# Patient Record
Sex: FEMALE | Race: WHITE | NOT HISPANIC OR LATINO
[De-identification: names, ages, dates, MRNs, and addresses within clinical notes are randomized per-mention and may not be internally consistent; named-entity substitution may affect disease eponyms.]

---

## 2017-03-07 PROBLEM — Z00.00 ENCOUNTER FOR PREVENTIVE HEALTH EXAMINATION: Status: ACTIVE | Noted: 2017-03-07

## 2017-03-08 ENCOUNTER — APPOINTMENT (OUTPATIENT)
Dept: OBGYN | Facility: CLINIC | Age: 62
End: 2017-03-08

## 2017-03-08 VITALS
HEIGHT: 67 IN | SYSTOLIC BLOOD PRESSURE: 120 MMHG | BODY MASS INDEX: 27.94 KG/M2 | DIASTOLIC BLOOD PRESSURE: 70 MMHG | WEIGHT: 178 LBS

## 2017-03-08 DIAGNOSIS — R31.9 HEMATURIA, UNSPECIFIED: ICD-10-CM

## 2017-03-08 DIAGNOSIS — Z85.3 PERSONAL HISTORY OF MALIGNANT NEOPLASM OF BREAST: ICD-10-CM

## 2017-03-08 DIAGNOSIS — Z82.49 FAMILY HISTORY OF ISCHEMIC HEART DISEASE AND OTHER DISEASES OF THE CIRCULATORY SYSTEM: ICD-10-CM

## 2017-03-08 DIAGNOSIS — N81.10 CYSTOCELE, UNSPECIFIED: ICD-10-CM

## 2017-03-08 LAB
BILIRUB UR QL STRIP: NEGATIVE
CLARITY UR: CLEAR
COLLECTION METHOD: NORMAL
GLUCOSE UR-MCNC: NEGATIVE
HCG UR QL: 0.2 EU/DL
HGB UR QL STRIP.AUTO: NORMAL
KETONES UR-MCNC: NEGATIVE
LEUKOCYTE ESTERASE UR QL STRIP: NEGATIVE
NITRITE UR QL STRIP: NEGATIVE
PH UR STRIP: 5.5
PROT UR STRIP-MCNC: NORMAL
SP GR UR STRIP: >=1.03

## 2017-03-09 LAB
APPEARANCE: CLEAR
BACTERIA: NEGATIVE
BILIRUBIN URINE: NEGATIVE
BLOOD URINE: NEGATIVE
COLOR: YELLOW
GLUCOSE QUALITATIVE U: NORMAL MG/DL
HYALINE CASTS: 3 /LPF
KETONES URINE: ABNORMAL
LEUKOCYTE ESTERASE URINE: NEGATIVE
MICROSCOPIC-UA: NORMAL
NITRITE URINE: NEGATIVE
PH URINE: 5.5
PROTEIN URINE: NEGATIVE MG/DL
RED BLOOD CELLS URINE: 2 /HPF
SPECIFIC GRAVITY URINE: 1.02
SQUAMOUS EPITHELIAL CELLS: 3 /HPF
UROBILINOGEN URINE: NORMAL MG/DL
WHITE BLOOD CELLS URINE: 1 /HPF

## 2017-03-10 LAB
BACTERIA UR CULT: NORMAL
HPV HIGH+LOW RISK DNA PNL CVX: NEGATIVE

## 2017-03-13 LAB — CYTOLOGY CVX/VAG DOC THIN PREP: NORMAL

## 2017-04-24 ENCOUNTER — FORM ENCOUNTER (OUTPATIENT)
Age: 62
End: 2017-04-24

## 2017-04-25 ENCOUNTER — APPOINTMENT (OUTPATIENT)
Dept: RADIOLOGY | Facility: CLINIC | Age: 62
End: 2017-04-25

## 2017-04-25 ENCOUNTER — OUTPATIENT (OUTPATIENT)
Dept: OUTPATIENT SERVICES | Facility: HOSPITAL | Age: 62
LOS: 1 days | End: 2017-04-25
Payer: COMMERCIAL

## 2017-04-25 ENCOUNTER — APPOINTMENT (OUTPATIENT)
Dept: ULTRASOUND IMAGING | Facility: CLINIC | Age: 62
End: 2017-04-25

## 2017-04-25 DIAGNOSIS — Z00.8 ENCOUNTER FOR OTHER GENERAL EXAMINATION: ICD-10-CM

## 2017-04-25 PROCEDURE — 77080 DXA BONE DENSITY AXIAL: CPT

## 2017-04-25 PROCEDURE — 76641 ULTRASOUND BREAST COMPLETE: CPT

## 2017-04-28 ENCOUNTER — MESSAGE (OUTPATIENT)
Age: 62
End: 2017-04-28

## 2017-04-28 DIAGNOSIS — R92.8 OTHER ABNORMAL AND INCONCLUSIVE FINDINGS ON DIAGNOSTIC IMAGING OF BREAST: ICD-10-CM

## 2017-04-28 DIAGNOSIS — M85.89 OTHER SPECIFIED DISORDERS OF BONE DENSITY AND STRUCTURE, MULTIPLE SITES: ICD-10-CM

## 2017-04-28 DIAGNOSIS — Z78.0 ASYMPTOMATIC MENOPAUSAL STATE: ICD-10-CM

## 2017-04-30 ENCOUNTER — TRANSCRIPTION ENCOUNTER (OUTPATIENT)
Age: 62
End: 2017-04-30

## 2020-10-23 ENCOUNTER — APPOINTMENT (OUTPATIENT)
Dept: NEUROSURGERY | Facility: CLINIC | Age: 65
End: 2020-10-23
Payer: COMMERCIAL

## 2020-10-23 ENCOUNTER — OUTPATIENT (OUTPATIENT)
Dept: OUTPATIENT SERVICES | Facility: HOSPITAL | Age: 65
LOS: 1 days | End: 2020-10-23
Payer: COMMERCIAL

## 2020-10-23 ENCOUNTER — APPOINTMENT (OUTPATIENT)
Dept: RADIOLOGY | Facility: CLINIC | Age: 65
End: 2020-10-23
Payer: COMMERCIAL

## 2020-10-23 VITALS
SYSTOLIC BLOOD PRESSURE: 157 MMHG | HEART RATE: 88 BPM | TEMPERATURE: 98.1 F | BODY MASS INDEX: 27.53 KG/M2 | WEIGHT: 155.38 LBS | DIASTOLIC BLOOD PRESSURE: 92 MMHG | HEIGHT: 63 IN | OXYGEN SATURATION: 98 %

## 2020-10-23 DIAGNOSIS — M41.9 SCOLIOSIS, UNSPECIFIED: ICD-10-CM

## 2020-10-23 PROCEDURE — 77080 DXA BONE DENSITY AXIAL: CPT | Mod: 26

## 2020-10-23 PROCEDURE — 77080 DXA BONE DENSITY AXIAL: CPT

## 2020-10-23 PROCEDURE — 99204 OFFICE O/P NEW MOD 45 MIN: CPT

## 2020-10-23 PROCEDURE — 72082 X-RAY EXAM ENTIRE SPI 2/3 VW: CPT

## 2020-10-23 PROCEDURE — 72082 X-RAY EXAM ENTIRE SPI 2/3 VW: CPT | Mod: 26

## 2020-10-23 PROCEDURE — 99072 ADDL SUPL MATRL&STAF TM PHE: CPT

## 2020-10-23 RX ORDER — CYCLOBENZAPRINE HYDROCHLORIDE 5 MG/1
5 TABLET, FILM COATED ORAL 3 TIMES DAILY
Qty: 42 | Refills: 0 | Status: ACTIVE | COMMUNITY
Start: 2020-10-23 | End: 1900-01-01

## 2020-10-23 NOTE — CONSULT LETTER
[Dear  ___] : Dear  [unfilled], [Courtesy Letter:] : I had the pleasure of seeing your patient, [unfilled], in my office today. [Sincerely,] : Sincerely, [FreeTextEntry2] : Mike Tamayo MD\par [address]\[place] [address] [FreeTextEntry1] : Mrs. Arthur Is a very pleasant 65-year-old female patient who was seen in our office today in regards to chronic back pain secondary to a scoliotic deformity.\par \par The patient states that she is known about her scoliotic deformity ever since she was approximately 20 years old.  The patient was previously offered surgical intervention at that time but deferred the surgical option for conservative management.  The patient has been doing very well with conservative management throughout the years but has noticed slight worsening in the last several years.  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  The patient currently only takes ibuprofen as needed for her pain.  The patient states that her pain does not radiate into the legs but can sometimes radiate into the buttocks.  The patient's pain primarily resides in the center of the spine up to the thoracic spine.  The patient currently experiences 3-4/10 pain had an episode several weeks ago when her pain was rated at a 10/10.\par \par The patient's past medical history is significant for a remote history of breast cancer with a bilateral mastectomy with reconstruction 14 years ago.  The patient has no known allergies.  The patient currently takes only ibuprofen as needed.\par \par On examination, the patient is alert, oriented, and compliant with the exam.  The patient demonstrates 5/5 strength in the upper and lower extremities bilaterally.  The patient demonstrates 2+ reflexes in the upper and lower extremities bilaterally.  The patient stands with a slightly stooped posture and lists a little to the right.  The patient's left shoulder is slightly higher than the right.\par \par The patient was sent down for imaging today which demonstrated a significant scoliotic deformity in the lumbar spine with a lateral listhesis noted at L3/4.  The patient's Alexandre angle in the lumbar spine is noted to be almost 70 degrees.  The patient's coronal vertical alignment, despite her significant scoliotic deformity, is only 2 cm to the right.  The patient does have a positive sagittal vertical alignment of approximately 12 centimeters.\par \par Taken together, the patient has a clinical history and radiographic findings most consistent with chronic low back and mid back pain secondary to her kyphoscoliotic deformity.  At this time, the patient is clear that she is not considering any surgical intervention for this condition.  However, the patient would like to entertain any and all conservative approaches.  At this time, I have recommended physical therapy for core strengthening exercises with particular emphasis on extension of the lumbar spine to help with her kyphosis.  I have also recommended an updated bone density scan as her previous bone density scan from April 25, 2017 demonstrated osteopenia.  I have provided the patient a very low dose of muscle relaxant to be used on an as-needed basis as well.  Given the patient's significant desire to help with her daughter's farm  requiring constant heavy lifting, I have recommended that the patient wear a brace to protect her spine during these maneuvers.  The patient will be following up with us in approximately 2 to 3 months to reevaluate her progress and I look forward to seeing her back then.\par \par \par \par  [FreeTextEntry3] : Pablito Grady MD, PhD, FRCPSC \par Attending Neurosurgeon \par [facility] \par [address] \[place] [address] \par Office: [phone] \par Fax: [phone]\par \par

## 2021-01-07 ENCOUNTER — APPOINTMENT (OUTPATIENT)
Dept: NEUROSURGERY | Facility: CLINIC | Age: 66
End: 2021-01-07

## 2023-01-13 ENCOUNTER — NON-APPOINTMENT (OUTPATIENT)
Age: 68
End: 2023-01-13

## 2023-01-16 ENCOUNTER — NON-APPOINTMENT (OUTPATIENT)
Age: 68
End: 2023-01-16

## 2023-01-16 ENCOUNTER — APPOINTMENT (OUTPATIENT)
Dept: OPHTHALMOLOGY | Facility: CLINIC | Age: 68
End: 2023-01-16
Payer: COMMERCIAL

## 2023-01-16 PROCEDURE — 99204 OFFICE O/P NEW MOD 45 MIN: CPT

## 2025-01-29 ENCOUNTER — EMERGENCY (EMERGENCY)
Facility: HOSPITAL | Age: 70
LOS: 0 days | Discharge: HOME | End: 2025-01-29
Attending: EMERGENCY MEDICINE
Payer: COMMERCIAL

## 2025-01-29 VITALS
OXYGEN SATURATION: 99 % | HEIGHT: 67 IN | RESPIRATION RATE: 18 BRPM | DIASTOLIC BLOOD PRESSURE: 93 MMHG | WEIGHT: 145.06 LBS | TEMPERATURE: 98 F | SYSTOLIC BLOOD PRESSURE: 151 MMHG | HEART RATE: 100 BPM

## 2025-01-29 VITALS
DIASTOLIC BLOOD PRESSURE: 88 MMHG | OXYGEN SATURATION: 100 % | TEMPERATURE: 98 F | HEART RATE: 100 BPM | RESPIRATION RATE: 16 BRPM | SYSTOLIC BLOOD PRESSURE: 138 MMHG

## 2025-01-29 DIAGNOSIS — M25.572 PAIN IN LEFT ANKLE AND JOINTS OF LEFT FOOT: ICD-10-CM
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
## 2025-01-29 PROCEDURE — 73610 X-RAY EXAM OF ANKLE: CPT | Mod: 26,LT,77

## 2025-01-29 PROCEDURE — 99285 EMERGENCY DEPT VISIT HI MDM: CPT

## 2025-01-29 PROCEDURE — 73700 CT LOWER EXTREMITY W/O DYE: CPT | Mod: 26,LT

## 2025-01-29 PROCEDURE — 73630 X-RAY EXAM OF FOOT: CPT | Mod: LT

## 2025-01-29 PROCEDURE — 73610 X-RAY EXAM OF ANKLE: CPT | Mod: LT

## 2025-01-29 PROCEDURE — 76376 3D RENDER W/INTRP POSTPROCES: CPT

## 2025-01-29 PROCEDURE — 73562 X-RAY EXAM OF KNEE 3: CPT | Mod: LT

## 2025-01-29 PROCEDURE — 99284 EMERGENCY DEPT VISIT MOD MDM: CPT | Mod: 25

## 2025-01-29 PROCEDURE — 73630 X-RAY EXAM OF FOOT: CPT | Mod: 26,LT

## 2025-01-29 PROCEDURE — 76376 3D RENDER W/INTRP POSTPROCES: CPT | Mod: 26

## 2025-01-29 PROCEDURE — 73590 X-RAY EXAM OF LOWER LEG: CPT | Mod: 26,LT,77

## 2025-01-29 PROCEDURE — 29515 APPLICATION SHORT LEG SPLINT: CPT | Mod: LT

## 2025-01-29 PROCEDURE — 73610 X-RAY EXAM OF ANKLE: CPT | Mod: 26,LT

## 2025-01-29 PROCEDURE — 73590 X-RAY EXAM OF LOWER LEG: CPT | Mod: 26,LT

## 2025-01-29 PROCEDURE — 73590 X-RAY EXAM OF LOWER LEG: CPT | Mod: LT

## 2025-01-29 PROCEDURE — 73700 CT LOWER EXTREMITY W/O DYE: CPT | Mod: MC,LT

## 2025-01-29 PROCEDURE — 73562 X-RAY EXAM OF KNEE 3: CPT | Mod: 26,LT

## 2025-01-29 RX ORDER — IBUPROFEN 200 MG
1 TABLET ORAL
Qty: 20 | Refills: 0
Start: 2025-01-29 | End: 2025-02-02

## 2025-01-29 RX ORDER — IBUPROFEN 200 MG
600 TABLET ORAL ONCE
Refills: 0 | Status: COMPLETED | OUTPATIENT
Start: 2025-01-29 | End: 2025-01-29

## 2025-01-29 RX ADMIN — Medication 600 MILLIGRAM(S): at 21:27

## 2025-01-29 NOTE — CONSULT NOTE ADULT - SUBJECTIVE AND OBJECTIVE BOX
69y Female presents with left ankle pain. States that she slipped down two steps at home and has been unable to ambulate since the injury. Denies numbness/tingling in the affected extremity. Denies head strike/LOC/other orthopedic injuries at this time. Community ambulator without assistance at baseline. Not currently on any AC medication.     PAST MEDICAL & SURGICAL HISTORY:    Home Medications:    Allergies    No Known Allergies    Intolerances        Vital Signs Last 24 Hrs  T(C): 36.7 (29 Jan 2025 15:42), Max: 36.7 (29 Jan 2025 15:42)  T(F): 98.1 (29 Jan 2025 15:42), Max: 98.1 (29 Jan 2025 15:42)  HR: 100 (29 Jan 2025 15:42) (100 - 100)  BP: 151/93 (29 Jan 2025 15:42) (151/93 - 151/93)  BP(mean): --  RR: 18 (29 Jan 2025 15:42) (18 - 18)  SpO2: 99% (29 Jan 2025 15:42) (99% - 99%)    Parameters below as of 29 Jan 2025 15:42  Patient On (Oxygen Delivery Method): room air        PHYSICAL EXAM  General: NAD, Awake and Alert  LLE:  Skin intact, no abrasions, no erythema, + global ankle edema with medial ecchymosis  No calf tenderness  Compartments soft and compressible  Motor: + TA/GSC/FHL/EHL  Sensory: +SILT SPN/DPN/ALAN/SAPH/TIB  + DP     Secondary Assessment:  NC/AT, NTTP of clavicles, NTTP of C-,T-,L-Spine, NTTP of Pelvis  UEs: NTTP of Shoulders, Elbows, Wrists, Hands; NT with AROM/PROM of Shoulders, Elbows, Wrists, Hands; AIN/PIN/Med/Uln/Msc/Rad/Ax intact  RLE: Able to SLR, NT with Log Roll, NT with Heel Strike, NTTP of Hip, Knee, Ankle, Foot; NT with AROM/PROM of Hip, Knee, Ankle, Foot; Q/H/Gsc/TA/EHL/FHL intact       IMAGING:  XR L ankle: bimalleolar fracture, pending official read    Assessment/Plan:  69y Female with left ankle fracture.    -images reviewed and discussed with patient, all questions and concerns addressed  -Pain control as needed  -FU CT scan L ankle  -NWB L trilam splint, keep splint clean dry and intact  -Will follow up with Dr. Gildardo Estrada for further orthopedic management outpatient  -Discussed with Dr. Garza who agrees.

## 2025-01-29 NOTE — ED STATDOCS - CARE PROVIDER_API CALL
Gildardo Estrada  Orthopaedic Surgery  [address] [place] [address], [address]  [place] [address]  Phone: [phone]  Fax: [phone]  Follow Up Time:

## 2025-01-29 NOTE — ED STATDOCS - OBJECTIVE STATEMENT
69 year-old female with no pertinent PMHx presents complaining of L ankle pain status post trip and fall down 2 stairs outside. Denies headstrike or LOC. Denies AC use. No other complaints at this time.

## 2025-01-29 NOTE — ED STATDOCS - CARE PROVIDERS DIRECT ADDRESSES
[email or web address]

## 2025-01-29 NOTE — ED ADULT NURSE NOTE - OBJECTIVE STATEMENT
69 year old female who fell down 2 stairs today outside while leaving work. Pt currently sitting in wheelchair with splint in place by orthopedics. Toes pink, warm and mobile with good sensation. Ortho to decide if pt needs admission. No headstrike, no LOC

## 2025-01-29 NOTE — ED ADULT NURSE NOTE - CHIEF COMPLAINT QUOTE
Pt c/o L ankle pain s/p trip and fall down 2 stairs outside. -head strike. -LOC. -AC/ASA use. GCS 15.

## 2025-01-29 NOTE — ED STATDOCS - PATIENT PORTAL LINK FT
You can access the FollowMyHealth Patient Portal offered by [facility] by registering at the following website: [email or web address]. By joining [employer]’s FollowMyHealth portal, you will also be able to view your health information using other applications (apps) compatible with our system. You can access the FollowMyHealth Patient Portal offered by [facility] by registering at the following website: [email or web address]. By joining [employer]’s FollowMyHealth portal, you will also be able to view your health information using other applications (apps) compatible with our system.

## 2025-01-29 NOTE — ED STATDOCS - ATTENDING APP SHARED VISIT CONTRIBUTION OF CARE
I,Ashkan Nice MD,  performed the initial face to face bedside interview with this patient regarding history of present illness, review of symptoms and relevant past medical, social and family history.  I completed an independent physical examination.  I was the initial provider who evaluated this patient. I have signed out the follow up of any pending tests (i.e. labs, radiological studies) to the ACP.  I have communicated the patient’s plan of care and disposition with the ACP.  The history, relevant review of systems, past medical and surgical history, medical decision making, and physical examination was documented by the scribe in my presence and I attest to the accuracy of the documentation.

## 2025-01-29 NOTE — ED STATDOCS - MUSCULOSKELETAL, MLM
swelling diffusely to L ankle with ecchymosis , tenderness to lateral malleolus, able to move toes, 2+ DP pulses, no tenderness near hip

## 2025-01-29 NOTE — ED ADULT TRIAGE NOTE - CHIEF COMPLAINT QUOTE
Pt c/o R ankle pain s/p trip and fall down 2 stairs outside. -head strike. -LOC. -AC/ASA use. GCS 15. Pt c/o L ankle pain s/p trip and fall down 2 stairs outside. -head strike. -LOC. -AC/ASA use. GCS 15.

## 2025-01-29 NOTE — ED STATDOCS - PROGRESS NOTE DETAILS
[place]: ortho splinted. -Will follow up with Dr. Gildardo Estrada for further orthopedic management outpatient [place]: pt unable to use crutches. reconsulted ortho [place]: pt feels that she may be unable to use crutches. reconsulted ortho [place]: pts daughter was able to get a wheelchair for pt. will give crutches also and will fu with ortho. pt also requesting motrin

## 2025-01-29 NOTE — ED ADULT NURSE NOTE - NSFALLRISKINTERV_ED_ALL_ED

## 2025-01-30 ENCOUNTER — TRANSCRIPTION ENCOUNTER (OUTPATIENT)
Age: 70
End: 2025-01-30

## 2025-08-06 ENCOUNTER — NON-APPOINTMENT (OUTPATIENT)
Age: 70
End: 2025-08-06

## 2025-08-06 ENCOUNTER — APPOINTMENT (OUTPATIENT)
Dept: OPHTHALMOLOGY | Facility: CLINIC | Age: 70
End: 2025-08-06
Payer: COMMERCIAL

## 2025-08-06 PROCEDURE — 92014 COMPRE OPH EXAM EST PT 1/>: CPT

## 2025-08-06 PROCEDURE — 92136 OPHTHALMIC BIOMETRY: CPT

## 2025-08-25 ENCOUNTER — NON-APPOINTMENT (OUTPATIENT)
Age: 70
End: 2025-08-25

## 2025-08-26 ENCOUNTER — APPOINTMENT (OUTPATIENT)
Dept: OPHTHALMOLOGY | Facility: HOSPITAL | Age: 70
End: 2025-08-26
Payer: COMMERCIAL

## 2025-08-26 ENCOUNTER — TRANSCRIPTION ENCOUNTER (OUTPATIENT)
Age: 70
End: 2025-08-26

## 2025-08-26 ENCOUNTER — NON-APPOINTMENT (OUTPATIENT)
Age: 70
End: 2025-08-26

## 2025-08-26 PROCEDURE — 66984 XCAPSL CTRC RMVL W/O ECP: CPT | Mod: RT

## 2025-08-27 ENCOUNTER — NON-APPOINTMENT (OUTPATIENT)
Age: 70
End: 2025-08-27

## 2025-08-27 ENCOUNTER — APPOINTMENT (OUTPATIENT)
Dept: OPHTHALMOLOGY | Facility: CLINIC | Age: 70
End: 2025-08-27
Payer: COMMERCIAL

## 2025-08-27 PROCEDURE — 99024 POSTOP FOLLOW-UP VISIT: CPT

## 2025-09-02 ENCOUNTER — NON-APPOINTMENT (OUTPATIENT)
Age: 70
End: 2025-09-02

## 2025-09-03 ENCOUNTER — APPOINTMENT (OUTPATIENT)
Dept: OPHTHALMOLOGY | Facility: CLINIC | Age: 70
End: 2025-09-03
Payer: COMMERCIAL

## 2025-09-03 ENCOUNTER — NON-APPOINTMENT (OUTPATIENT)
Age: 70
End: 2025-09-03

## 2025-09-03 PROCEDURE — 99024 POSTOP FOLLOW-UP VISIT: CPT
